# Patient Record
Sex: MALE | Race: WHITE | NOT HISPANIC OR LATINO
[De-identification: names, ages, dates, MRNs, and addresses within clinical notes are randomized per-mention and may not be internally consistent; named-entity substitution may affect disease eponyms.]

---

## 2021-04-28 ENCOUNTER — APPOINTMENT (OUTPATIENT)
Dept: ORTHOPEDIC SURGERY | Facility: CLINIC | Age: 56
End: 2021-04-28
Payer: MEDICARE

## 2021-04-28 VITALS — HEIGHT: 69 IN | BODY MASS INDEX: 39.25 KG/M2 | WEIGHT: 265 LBS

## 2021-04-28 DIAGNOSIS — S63.502A UNSPECIFIED SPRAIN OF LEFT WRIST, INITIAL ENCOUNTER: ICD-10-CM

## 2021-04-28 PROCEDURE — 99203 OFFICE O/P NEW LOW 30 MIN: CPT

## 2021-04-28 PROCEDURE — 73110 X-RAY EXAM OF WRIST: CPT | Mod: LT

## 2021-04-29 PROBLEM — S63.502A WRIST SPRAIN, LEFT, INITIAL ENCOUNTER: Status: ACTIVE | Noted: 2021-04-29

## 2021-04-29 NOTE — DISCUSSION/SUMMARY
[de-identified] : 54 y/o male with left wrist sprain. \par \par Patient presents for evaluation of his left wrist following an injury sustained.  The does not appear to be any gross fracture or dysfunction/deformity on x-ray imaging, and likely consistent with an ulnar sided sprain. \par \par A discussion was had with the patient regarding symptomatic treatment for wrist sprain. Sprains are typically injuries to the ligament and can affect athletic function. Depending on the severity of injury, sprains can cause local pain, swelling, tenderness and bruising. Low-grade sprains may resolve within days-weeks, whereas high-grade sprains (i.e. ligament tear) may require as long as 6-8wks of recovery. Depending on the grade of severity, advanced imaging or referral to physical therapy may be indicated to confirm degree of injusy and help speed recovery and to ameliorate acute symptoms.\par \par Recommendations; volar bracing for comfort x2 weeks. Conservative care & observation, this includes rest/activity avoidance until less symptomatic with subsequent gradual return to full activity as directed. Patient may also use OTC NSAID's or acetaminophen as tolerated, application of ice to the area 2-3x daily for 20 minutes after periods of activity.\par \par Follow up as needed.

## 2021-04-29 NOTE — PHYSICAL EXAM
[de-identified] : Oriented to time, place, person\par Mood: Normal\par Affect: Normal\par Appearance: Healthy, well appearing, no acute distress.\par Gait: Normal\par Assistive Devices: None\par \par Left Wrist Exam:\par \par Skin: Clean, dry, intact. No ecchymosis. +mild swelling. No palpable deformity. No palpable joint effusion. No crepitus.\par ROM: LEFT Full, full supination/pronation.\par Painful ROM: Pain with wrist extension\par Tenderness: + ttp ulnar wrist at carpal joints. No pain at the scapholunate interval. No snuffbox pain. No TTP at the distal radius, distal ulna, or the DRUJ.\par Strength: 5/5 wrist flexion, 5/5 wrist extension, 5/5 supination, 5/5 pronation\par Stability: Stable DRUJ\par Vasc: 2+ radial pulse, <2s cap refill throughout\par Sensation: In tact to light touch throughout\par Neuro: Negative Tinel's over median nerve, AIN/PIN/Ulnar nerve in tact to motor/sensation.  [de-identified] : The following radiographs were ordered and read by me during this patients visit. I reviewed each radiograph in detail with the patient and discussed the findings as highlighted below. \par \par 3 views of the left wrist were obtained today, 04/28/2021, that show no acute fracture or dislocation. There is no degenerative change seen. There is no gross malalignment. No significant other obvious osseous abnormality, otherwise unremarkable.

## 2021-04-29 NOTE — ADDENDUM
[FreeTextEntry1] : This note was written by Renetta Martinez on 04/28/2021 acting solely as a scribe for Dr. Carlos Nguyen.\par \par All medical record entries made by the Scribe were at my, Dr. Carlos Nguyen, direction and personally dictated by me on 04/28/2021. I have personally reviewed the chart and agree that the record accurately reflects my personal performance of the history, physical exam, assessment and plan.

## 2021-04-29 NOTE — HISTORY OF PRESENT ILLNESS
[de-identified] : 55 year old RHD  male presents today with left wrist pain x 2 days. He fell couple of days ago but does not recall how he landed. The pain is improving, present with wrist extension. He is not taking pain medication. ACE bandage is helpful. Denies numbness or tingling.  Symptoms appear to be radiating through the dorsal ulnar aspect of the wrist.\par \par The patient's past medical history, past surgical history, medications and allergies were reviewed by me today with the patient and documented accordingly. In addition, the patient's family and social history, which were noncontributory to this visit, were reviewed also.

## 2023-10-24 ENCOUNTER — RESULT REVIEW (OUTPATIENT)
Age: 58
End: 2023-10-24

## 2023-10-24 ENCOUNTER — APPOINTMENT (OUTPATIENT)
Dept: DERMATOLOGY | Facility: CLINIC | Age: 58
End: 2023-10-24
Payer: MEDICARE

## 2023-10-24 PROCEDURE — 99204 OFFICE O/P NEW MOD 45 MIN: CPT | Mod: 25

## 2023-10-24 PROCEDURE — 17000 DESTRUCT PREMALG LESION: CPT | Mod: 59

## 2023-10-24 PROCEDURE — 11102 TANGNTL BX SKIN SINGLE LES: CPT

## 2023-10-24 PROCEDURE — 11103 TANGNTL BX SKIN EA SEP/ADDL: CPT

## 2024-11-12 ENCOUNTER — APPOINTMENT (OUTPATIENT)
Dept: DERMATOLOGY | Facility: CLINIC | Age: 59
End: 2024-11-12
Payer: MEDICARE

## 2024-11-12 PROCEDURE — 17110 DESTRUCTION B9 LES UP TO 14: CPT

## 2024-11-12 PROCEDURE — 99214 OFFICE O/P EST MOD 30 MIN: CPT | Mod: 25
